# Patient Record
Sex: MALE
[De-identification: names, ages, dates, MRNs, and addresses within clinical notes are randomized per-mention and may not be internally consistent; named-entity substitution may affect disease eponyms.]

---

## 2021-01-01 ENCOUNTER — APPOINTMENT (OUTPATIENT)
Dept: VASCULAR SURGERY | Facility: CLINIC | Age: 76
End: 2021-01-01

## 2022-01-01 ENCOUNTER — INPATIENT (INPATIENT)
Facility: HOSPITAL | Age: 77
LOS: 1 days | DRG: 283 | End: 2022-08-17
Attending: THORACIC SURGERY (CARDIOTHORACIC VASCULAR SURGERY) | Admitting: THORACIC SURGERY (CARDIOTHORACIC VASCULAR SURGERY)
Payer: MEDICARE

## 2022-01-01 VITALS
HEART RATE: 61 BPM | DIASTOLIC BLOOD PRESSURE: 50 MMHG | SYSTOLIC BLOOD PRESSURE: 106 MMHG | RESPIRATION RATE: 41 BRPM | OXYGEN SATURATION: 100 %

## 2022-01-01 VITALS — TEMPERATURE: 98 F

## 2022-01-01 DIAGNOSIS — I71.2 THORACIC AORTIC ANEURYSM, WITHOUT RUPTURE: ICD-10-CM

## 2022-01-01 DIAGNOSIS — Z98.890 OTHER SPECIFIED POSTPROCEDURAL STATES: Chronic | ICD-10-CM

## 2022-01-01 DIAGNOSIS — I73.9 PERIPHERAL VASCULAR DISEASE, UNSPECIFIED: ICD-10-CM

## 2022-01-01 DIAGNOSIS — Z86.79 PERSONAL HISTORY OF OTHER DISEASES OF THE CIRCULATORY SYSTEM: ICD-10-CM

## 2022-01-01 DIAGNOSIS — I71.8 AORTIC ANEURYSM OF UNSPECIFIED SITE, RUPTURED: Chronic | ICD-10-CM

## 2022-01-01 DIAGNOSIS — I21.4 NON-ST ELEVATION (NSTEMI) MYOCARDIAL INFARCTION: ICD-10-CM

## 2022-01-01 DIAGNOSIS — N18.30 CHRONIC KIDNEY DISEASE, STAGE 3 UNSPECIFIED: ICD-10-CM

## 2022-01-01 DIAGNOSIS — I44.1 ATRIOVENTRICULAR BLOCK, SECOND DEGREE: ICD-10-CM

## 2022-01-01 DIAGNOSIS — S68.411A COMPLETE TRAUMATIC AMPUTATION OF RIGHT HAND AT WRIST LEVEL, INITIAL ENCOUNTER: Chronic | ICD-10-CM

## 2022-01-01 DIAGNOSIS — I70.209 UNSPECIFIED ATHEROSCLEROSIS OF NATIVE ARTERIES OF EXTREMITIES, UNSPECIFIED EXTREMITY: Chronic | ICD-10-CM

## 2022-01-01 LAB
A1C WITH ESTIMATED AVERAGE GLUCOSE RESULT: 4.5 % — SIGNIFICANT CHANGE UP (ref 4–5.6)
ALBUMIN SERPL ELPH-MCNC: 3.3 G/DL — SIGNIFICANT CHANGE UP (ref 3.3–5)
ALBUMIN SERPL ELPH-MCNC: 3.6 G/DL — SIGNIFICANT CHANGE UP (ref 3.3–5)
ALBUMIN SERPL ELPH-MCNC: 3.9 G/DL — SIGNIFICANT CHANGE UP (ref 3.3–5)
ALP SERPL-CCNC: 114 U/L — SIGNIFICANT CHANGE UP (ref 40–120)
ALP SERPL-CCNC: 117 U/L — SIGNIFICANT CHANGE UP (ref 40–120)
ALP SERPL-CCNC: 94 U/L — SIGNIFICANT CHANGE UP (ref 40–120)
ALT FLD-CCNC: 39 U/L — SIGNIFICANT CHANGE UP (ref 10–45)
ALT FLD-CCNC: 42 U/L — SIGNIFICANT CHANGE UP (ref 10–45)
ALT FLD-CCNC: 44 U/L — SIGNIFICANT CHANGE UP (ref 10–45)
AMPHET UR-MCNC: NEGATIVE — SIGNIFICANT CHANGE UP
ANION GAP SERPL CALC-SCNC: 12 MMOL/L — SIGNIFICANT CHANGE UP (ref 5–17)
ANION GAP SERPL CALC-SCNC: 12 MMOL/L — SIGNIFICANT CHANGE UP (ref 5–17)
ANION GAP SERPL CALC-SCNC: 13 MMOL/L — SIGNIFICANT CHANGE UP (ref 5–17)
ANISOCYTOSIS BLD QL: SLIGHT — SIGNIFICANT CHANGE UP
APPEARANCE UR: CLEAR — SIGNIFICANT CHANGE UP
APTT BLD: 27.4 SEC — LOW (ref 27.5–35.5)
APTT BLD: 29.1 SEC — SIGNIFICANT CHANGE UP (ref 27.5–35.5)
APTT BLD: 31.1 SEC — SIGNIFICANT CHANGE UP (ref 27.5–35.5)
AST SERPL-CCNC: 55 U/L — HIGH (ref 10–40)
AST SERPL-CCNC: 90 U/L — HIGH (ref 10–40)
AST SERPL-CCNC: 93 U/L — HIGH (ref 10–40)
BACTERIA # UR AUTO: ABNORMAL /HPF
BARBITURATES UR SCN-MCNC: NEGATIVE — SIGNIFICANT CHANGE UP
BASOPHILS # BLD AUTO: 0 K/UL — SIGNIFICANT CHANGE UP (ref 0–0.2)
BASOPHILS NFR BLD AUTO: 0 % — SIGNIFICANT CHANGE UP (ref 0–2)
BENZODIAZ UR-MCNC: NEGATIVE — SIGNIFICANT CHANGE UP
BILIRUB SERPL-MCNC: 0.7 MG/DL — SIGNIFICANT CHANGE UP (ref 0.2–1.2)
BILIRUB SERPL-MCNC: 1.1 MG/DL — SIGNIFICANT CHANGE UP (ref 0.2–1.2)
BILIRUB SERPL-MCNC: 1.2 MG/DL — SIGNIFICANT CHANGE UP (ref 0.2–1.2)
BILIRUB UR-MCNC: NEGATIVE — SIGNIFICANT CHANGE UP
BLD GP AB SCN SERPL QL: NEGATIVE — SIGNIFICANT CHANGE UP
BUN SERPL-MCNC: 101 MG/DL — HIGH (ref 7–23)
BUN SERPL-MCNC: 179 MG/DL — HIGH (ref 7–23)
BUN SERPL-MCNC: 82 MG/DL — HIGH (ref 7–23)
BURR CELLS BLD QL SMEAR: PRESENT — SIGNIFICANT CHANGE UP
CALCIUM SERPL-MCNC: 11.6 MG/DL — HIGH (ref 8.4–10.5)
CALCIUM SERPL-MCNC: 11.8 MG/DL — HIGH (ref 8.4–10.5)
CALCIUM SERPL-MCNC: 9.8 MG/DL — SIGNIFICANT CHANGE UP (ref 8.4–10.5)
CHLORIDE SERPL-SCNC: 109 MMOL/L — HIGH (ref 96–108)
CHLORIDE SERPL-SCNC: 110 MMOL/L — HIGH (ref 96–108)
CHLORIDE SERPL-SCNC: 114 MMOL/L — HIGH (ref 96–108)
CHOLEST SERPL-MCNC: 107 MG/DL — SIGNIFICANT CHANGE UP
CO2 SERPL-SCNC: 17 MMOL/L — LOW (ref 22–31)
CO2 SERPL-SCNC: 18 MMOL/L — LOW (ref 22–31)
CO2 SERPL-SCNC: 19 MMOL/L — LOW (ref 22–31)
COCAINE METAB.OTHER UR-MCNC: NEGATIVE — SIGNIFICANT CHANGE UP
COLOR SPEC: YELLOW — SIGNIFICANT CHANGE UP
CREAT SERPL-MCNC: 2.24 MG/DL — HIGH (ref 0.5–1.3)
CREAT SERPL-MCNC: 2.58 MG/DL — HIGH (ref 0.5–1.3)
CREAT SERPL-MCNC: 3.87 MG/DL — HIGH (ref 0.5–1.3)
DIFF PNL FLD: NEGATIVE — SIGNIFICANT CHANGE UP
EGFR: 16 ML/MIN/1.73M2 — LOW
EGFR: 25 ML/MIN/1.73M2 — LOW
EGFR: 30 ML/MIN/1.73M2 — LOW
EOSINOPHIL # BLD AUTO: 0 K/UL — SIGNIFICANT CHANGE UP (ref 0–0.5)
EOSINOPHIL NFR BLD AUTO: 0 % — SIGNIFICANT CHANGE UP (ref 0–6)
EPI CELLS # UR: SIGNIFICANT CHANGE UP /HPF (ref 0–5)
ESTIMATED AVERAGE GLUCOSE: 82 MG/DL — SIGNIFICANT CHANGE UP (ref 68–114)
GAS PNL BLDA: SIGNIFICANT CHANGE UP
GAS PNL BLDA: SIGNIFICANT CHANGE UP
GLUCOSE BLDC GLUCOMTR-MCNC: 105 MG/DL — HIGH (ref 70–99)
GLUCOSE SERPL-MCNC: 106 MG/DL — HIGH (ref 70–99)
GLUCOSE SERPL-MCNC: 114 MG/DL — HIGH (ref 70–99)
GLUCOSE SERPL-MCNC: 115 MG/DL — HIGH (ref 70–99)
GLUCOSE UR QL: NEGATIVE — SIGNIFICANT CHANGE UP
HCT VFR BLD CALC: 18.3 % — CRITICAL LOW (ref 39–50)
HCT VFR BLD CALC: 28.6 % — LOW (ref 39–50)
HCT VFR BLD CALC: 31.6 % — LOW (ref 39–50)
HCV AB S/CO SERPL IA: 0.04 S/CO — SIGNIFICANT CHANGE UP
HCV AB SERPL-IMP: SIGNIFICANT CHANGE UP
HDLC SERPL-MCNC: 50 MG/DL — SIGNIFICANT CHANGE UP
HGB BLD-MCNC: 10.6 G/DL — LOW (ref 13–17)
HGB BLD-MCNC: 5.8 G/DL — CRITICAL LOW (ref 13–17)
HGB BLD-MCNC: 9.7 G/DL — LOW (ref 13–17)
HYALINE CASTS # UR AUTO: SIGNIFICANT CHANGE UP /LPF (ref 0–2)
HYPOCHROMIA BLD QL: SLIGHT — SIGNIFICANT CHANGE UP
INR BLD: 1.57 — HIGH (ref 0.88–1.16)
INR BLD: 1.62 — HIGH (ref 0.88–1.16)
INR BLD: 1.7 — HIGH (ref 0.88–1.16)
KETONES UR-MCNC: NEGATIVE — SIGNIFICANT CHANGE UP
LACTATE SERPL-SCNC: 1.1 MMOL/L — SIGNIFICANT CHANGE UP (ref 0.5–2)
LACTATE SERPL-SCNC: 1.4 MMOL/L — SIGNIFICANT CHANGE UP (ref 0.5–2)
LEUKOCYTE ESTERASE UR-ACNC: NEGATIVE — SIGNIFICANT CHANGE UP
LIPID PNL WITH DIRECT LDL SERPL: 37 MG/DL — SIGNIFICANT CHANGE UP
LYMPHOCYTES # BLD AUTO: 0.2 K/UL — LOW (ref 1–3.3)
LYMPHOCYTES # BLD AUTO: 1.8 % — LOW (ref 13–44)
MAGNESIUM SERPL-MCNC: 1.7 MG/DL — SIGNIFICANT CHANGE UP (ref 1.6–2.6)
MAGNESIUM SERPL-MCNC: 1.8 MG/DL — SIGNIFICANT CHANGE UP (ref 1.6–2.6)
MAGNESIUM SERPL-MCNC: 2.1 MG/DL — SIGNIFICANT CHANGE UP (ref 1.6–2.6)
MANUAL SMEAR VERIFICATION: SIGNIFICANT CHANGE UP
MCHC RBC-ENTMCNC: 31.7 GM/DL — LOW (ref 32–36)
MCHC RBC-ENTMCNC: 32.6 PG — SIGNIFICANT CHANGE UP (ref 27–34)
MCHC RBC-ENTMCNC: 32.9 PG — SIGNIFICANT CHANGE UP (ref 27–34)
MCHC RBC-ENTMCNC: 33 PG — SIGNIFICANT CHANGE UP (ref 27–34)
MCHC RBC-ENTMCNC: 33.5 GM/DL — SIGNIFICANT CHANGE UP (ref 32–36)
MCHC RBC-ENTMCNC: 33.9 GM/DL — SIGNIFICANT CHANGE UP (ref 32–36)
MCV RBC AUTO: 102.8 FL — HIGH (ref 80–100)
MCV RBC AUTO: 96.9 FL — SIGNIFICANT CHANGE UP (ref 80–100)
MCV RBC AUTO: 98.4 FL — SIGNIFICANT CHANGE UP (ref 80–100)
METHADONE UR-MCNC: NEGATIVE — SIGNIFICANT CHANGE UP
MONOCYTES # BLD AUTO: 0 K/UL — SIGNIFICANT CHANGE UP (ref 0–0.9)
MONOCYTES NFR BLD AUTO: 0 % — LOW (ref 2–14)
NEUTROPHILS # BLD AUTO: 11.11 K/UL — HIGH (ref 1.8–7.4)
NEUTROPHILS NFR BLD AUTO: 98.2 % — HIGH (ref 43–77)
NITRITE UR-MCNC: NEGATIVE — SIGNIFICANT CHANGE UP
NON HDL CHOLESTEROL: 57 MG/DL — SIGNIFICANT CHANGE UP
NRBC # BLD: 0 /100 WBCS — SIGNIFICANT CHANGE UP (ref 0–0)
NRBC # BLD: 0 /100 WBCS — SIGNIFICANT CHANGE UP (ref 0–0)
NT-PROBNP SERPL-SCNC: HIGH PG/ML (ref 0–300)
OPIATES UR-MCNC: POSITIVE
OVALOCYTES BLD QL SMEAR: SLIGHT — SIGNIFICANT CHANGE UP
PCP SPEC-MCNC: SIGNIFICANT CHANGE UP
PCP UR-MCNC: NEGATIVE — SIGNIFICANT CHANGE UP
PH UR: 6 — SIGNIFICANT CHANGE UP (ref 5–8)
PHOSPHATE SERPL-MCNC: 2.9 MG/DL — SIGNIFICANT CHANGE UP (ref 2.5–4.5)
PHOSPHATE SERPL-MCNC: 3 MG/DL — SIGNIFICANT CHANGE UP (ref 2.5–4.5)
PLAT MORPH BLD: NORMAL — SIGNIFICANT CHANGE UP
PLATELET # BLD AUTO: 162 K/UL — SIGNIFICANT CHANGE UP (ref 150–400)
PLATELET # BLD AUTO: 175 K/UL — SIGNIFICANT CHANGE UP (ref 150–400)
PLATELET # BLD AUTO: 176 K/UL — SIGNIFICANT CHANGE UP (ref 150–400)
POIKILOCYTOSIS BLD QL AUTO: SLIGHT — SIGNIFICANT CHANGE UP
POLYCHROMASIA BLD QL SMEAR: SLIGHT — SIGNIFICANT CHANGE UP
POTASSIUM SERPL-MCNC: 4.8 MMOL/L — SIGNIFICANT CHANGE UP (ref 3.5–5.3)
POTASSIUM SERPL-MCNC: 5 MMOL/L — SIGNIFICANT CHANGE UP (ref 3.5–5.3)
POTASSIUM SERPL-MCNC: 6.1 MMOL/L — HIGH (ref 3.5–5.3)
POTASSIUM SERPL-SCNC: 4.8 MMOL/L — SIGNIFICANT CHANGE UP (ref 3.5–5.3)
POTASSIUM SERPL-SCNC: 5 MMOL/L — SIGNIFICANT CHANGE UP (ref 3.5–5.3)
POTASSIUM SERPL-SCNC: 6.1 MMOL/L — HIGH (ref 3.5–5.3)
PROT SERPL-MCNC: 4.8 G/DL — LOW (ref 6–8.3)
PROT SERPL-MCNC: 6.1 G/DL — SIGNIFICANT CHANGE UP (ref 6–8.3)
PROT SERPL-MCNC: 6.1 G/DL — SIGNIFICANT CHANGE UP (ref 6–8.3)
PROT UR-MCNC: ABNORMAL MG/DL
PROTHROM AB SERPL-ACNC: 18.8 SEC — HIGH (ref 10.5–13.4)
PROTHROM AB SERPL-ACNC: 19.4 SEC — HIGH (ref 10.5–13.4)
PROTHROM AB SERPL-ACNC: 20.3 SEC — HIGH (ref 10.5–13.4)
RBC # BLD: 1.78 M/UL — LOW (ref 4.2–5.8)
RBC # BLD: 2.95 M/UL — LOW (ref 4.2–5.8)
RBC # BLD: 3.21 M/UL — LOW (ref 4.2–5.8)
RBC # FLD: 14.2 % — SIGNIFICANT CHANGE UP (ref 10.3–14.5)
RBC # FLD: 14.4 % — SIGNIFICANT CHANGE UP (ref 10.3–14.5)
RBC # FLD: 14.7 % — HIGH (ref 10.3–14.5)
RBC BLD AUTO: ABNORMAL
RBC CASTS # UR COMP ASSIST: ABNORMAL /HPF
RH IG SCN BLD-IMP: POSITIVE — SIGNIFICANT CHANGE UP
RH IG SCN BLD-IMP: POSITIVE — SIGNIFICANT CHANGE UP
SARS-COV-2 RNA SPEC QL NAA+PROBE: SIGNIFICANT CHANGE UP
SCHISTOCYTES BLD QL AUTO: SLIGHT — SIGNIFICANT CHANGE UP
SODIUM SERPL-SCNC: 140 MMOL/L — SIGNIFICANT CHANGE UP (ref 135–145)
SODIUM SERPL-SCNC: 140 MMOL/L — SIGNIFICANT CHANGE UP (ref 135–145)
SODIUM SERPL-SCNC: 144 MMOL/L — SIGNIFICANT CHANGE UP (ref 135–145)
SP GR SPEC: 1.01 — SIGNIFICANT CHANGE UP (ref 1–1.03)
THC UR QL: NEGATIVE — SIGNIFICANT CHANGE UP
TRIGL SERPL-MCNC: 100 MG/DL — SIGNIFICANT CHANGE UP
TROPONIN T SERPL-MCNC: 0.05 NG/ML — CRITICAL HIGH (ref 0–0.01)
TSH SERPL-MCNC: 1.5 UIU/ML — SIGNIFICANT CHANGE UP (ref 0.27–4.2)
UROBILINOGEN FLD QL: 0.2 E.U./DL — SIGNIFICANT CHANGE UP
WBC # BLD: 11.31 K/UL — HIGH (ref 3.8–10.5)
WBC # BLD: 11.31 K/UL — HIGH (ref 3.8–10.5)
WBC # BLD: 13.64 K/UL — HIGH (ref 3.8–10.5)
WBC # FLD AUTO: 11.31 K/UL — HIGH (ref 3.8–10.5)
WBC # FLD AUTO: 11.31 K/UL — HIGH (ref 3.8–10.5)
WBC # FLD AUTO: 13.64 K/UL — HIGH (ref 3.8–10.5)
WBC UR QL: < 5 /HPF — SIGNIFICANT CHANGE UP

## 2022-01-01 PROCEDURE — 99223 1ST HOSP IP/OBS HIGH 75: CPT

## 2022-01-01 PROCEDURE — 99291 CRITICAL CARE FIRST HOUR: CPT

## 2022-01-01 PROCEDURE — 74018 RADEX ABDOMEN 1 VIEW: CPT | Mod: 26

## 2022-01-01 PROCEDURE — 99292 CRITICAL CARE ADDL 30 MIN: CPT

## 2022-01-01 PROCEDURE — 93010 ELECTROCARDIOGRAM REPORT: CPT

## 2022-01-01 PROCEDURE — 71045 X-RAY EXAM CHEST 1 VIEW: CPT | Mod: 26

## 2022-01-01 RX ORDER — ALBUMIN HUMAN 25 %
250 VIAL (ML) INTRAVENOUS
Refills: 0 | Status: COMPLETED | OUTPATIENT
Start: 2022-01-01 | End: 2022-01-01

## 2022-01-01 RX ORDER — HYDRALAZINE HCL 50 MG
10 TABLET ORAL EVERY 6 HOURS
Refills: 0 | Status: DISCONTINUED | OUTPATIENT
Start: 2022-01-01 | End: 2022-01-01

## 2022-01-01 RX ORDER — NITROGLYCERIN 6.5 MG
1 CAPSULE, EXTENDED RELEASE ORAL DAILY
Refills: 0 | Status: DISCONTINUED | OUTPATIENT
Start: 2022-01-01 | End: 2022-01-01

## 2022-01-01 RX ORDER — METOPROLOL TARTRATE 50 MG
5 TABLET ORAL EVERY 8 HOURS
Refills: 0 | Status: DISCONTINUED | OUTPATIENT
Start: 2022-01-01 | End: 2022-01-01

## 2022-01-01 RX ORDER — SODIUM CHLORIDE 9 MG/ML
3 INJECTION INTRAMUSCULAR; INTRAVENOUS; SUBCUTANEOUS EVERY 8 HOURS
Refills: 0 | Status: DISCONTINUED | OUTPATIENT
Start: 2022-01-01 | End: 2022-01-01

## 2022-01-01 RX ORDER — LANOLIN ALCOHOL/MO/W.PET/CERES
1 CREAM (GRAM) TOPICAL
Qty: 0 | Refills: 0 | DISCHARGE

## 2022-01-01 RX ORDER — NICARDIPINE HYDROCHLORIDE 30 MG/1
5 CAPSULE, EXTENDED RELEASE ORAL
Qty: 40 | Refills: 0 | Status: DISCONTINUED | OUTPATIENT
Start: 2022-01-01 | End: 2022-01-01

## 2022-01-01 RX ORDER — FUROSEMIDE 40 MG
40 TABLET ORAL ONCE
Refills: 0 | Status: COMPLETED | OUTPATIENT
Start: 2022-01-01 | End: 2022-01-01

## 2022-01-01 RX ORDER — PIPERACILLIN AND TAZOBACTAM 4; .5 G/20ML; G/20ML
3.38 INJECTION, POWDER, LYOPHILIZED, FOR SOLUTION INTRAVENOUS EVERY 8 HOURS
Refills: 0 | Status: DISCONTINUED | OUTPATIENT
Start: 2022-01-01 | End: 2022-01-01

## 2022-01-01 RX ORDER — ACETAMINOPHEN 500 MG
1000 TABLET ORAL ONCE
Refills: 0 | Status: COMPLETED | OUTPATIENT
Start: 2022-01-01 | End: 2022-01-01

## 2022-01-01 RX ORDER — SENNA PLUS 8.6 MG/1
2 TABLET ORAL
Qty: 0 | Refills: 0 | DISCHARGE

## 2022-01-01 RX ORDER — QUETIAPINE FUMARATE 200 MG/1
0 TABLET, FILM COATED ORAL
Qty: 0 | Refills: 0 | DISCHARGE

## 2022-01-01 RX ORDER — METOPROLOL TARTRATE 50 MG
1 TABLET ORAL
Qty: 0 | Refills: 0 | DISCHARGE

## 2022-01-01 RX ORDER — MORPHINE SULFATE 50 MG/1
2.5 CAPSULE, EXTENDED RELEASE ORAL
Refills: 0 | Status: DISCONTINUED | OUTPATIENT
Start: 2022-01-01 | End: 2022-01-01

## 2022-01-01 RX ORDER — PIPERACILLIN AND TAZOBACTAM 4; .5 G/20ML; G/20ML
2.25 INJECTION, POWDER, LYOPHILIZED, FOR SOLUTION INTRAVENOUS EVERY 6 HOURS
Refills: 0 | Status: DISCONTINUED | OUTPATIENT
Start: 2022-01-01 | End: 2022-01-01

## 2022-01-01 RX ORDER — THIAMINE MONONITRATE (VIT B1) 100 MG
1 TABLET ORAL
Qty: 0 | Refills: 0 | DISCHARGE

## 2022-01-01 RX ORDER — PIPERACILLIN AND TAZOBACTAM 4; .5 G/20ML; G/20ML
3.38 INJECTION, POWDER, LYOPHILIZED, FOR SOLUTION INTRAVENOUS ONCE
Refills: 0 | Status: COMPLETED | OUTPATIENT
Start: 2022-01-01 | End: 2022-01-01

## 2022-01-01 RX ORDER — IPRATROPIUM/ALBUTEROL SULFATE 18-103MCG
3 AEROSOL WITH ADAPTER (GRAM) INHALATION ONCE
Refills: 0 | Status: COMPLETED | OUTPATIENT
Start: 2022-01-01 | End: 2022-01-01

## 2022-01-01 RX ORDER — ROBINUL 0.2 MG/ML
0.4 INJECTION INTRAMUSCULAR; INTRAVENOUS
Refills: 0 | Status: DISCONTINUED | OUTPATIENT
Start: 2022-01-01 | End: 2022-01-01

## 2022-01-01 RX ORDER — MORPHINE SULFATE 50 MG/1
1 CAPSULE, EXTENDED RELEASE ORAL
Qty: 100 | Refills: 0 | Status: DISCONTINUED | OUTPATIENT
Start: 2022-01-01 | End: 2022-01-01

## 2022-01-01 RX ADMIN — Medication 5 MILLIGRAM(S): at 07:37

## 2022-01-01 RX ADMIN — Medication 40 MILLIGRAM(S): at 09:11

## 2022-01-01 RX ADMIN — MORPHINE SULFATE 2.5 MILLIGRAM(S): 50 CAPSULE, EXTENDED RELEASE ORAL at 23:00

## 2022-01-01 RX ADMIN — PIPERACILLIN AND TAZOBACTAM 200 GRAM(S): 4; .5 INJECTION, POWDER, LYOPHILIZED, FOR SOLUTION INTRAVENOUS at 13:11

## 2022-01-01 RX ADMIN — SODIUM CHLORIDE 3 MILLILITER(S): 9 INJECTION INTRAMUSCULAR; INTRAVENOUS; SUBCUTANEOUS at 05:28

## 2022-01-01 RX ADMIN — SODIUM CHLORIDE 3 MILLILITER(S): 9 INJECTION INTRAMUSCULAR; INTRAVENOUS; SUBCUTANEOUS at 22:29

## 2022-01-01 RX ADMIN — Medication 1 PATCH: at 01:15

## 2022-01-01 RX ADMIN — MORPHINE SULFATE 2.5 MILLIGRAM(S): 50 CAPSULE, EXTENDED RELEASE ORAL at 05:07

## 2022-01-01 RX ADMIN — PIPERACILLIN AND TAZOBACTAM 100 GRAM(S): 4; .5 INJECTION, POWDER, LYOPHILIZED, FOR SOLUTION INTRAVENOUS at 01:13

## 2022-01-01 RX ADMIN — PIPERACILLIN AND TAZOBACTAM 100 GRAM(S): 4; .5 INJECTION, POWDER, LYOPHILIZED, FOR SOLUTION INTRAVENOUS at 19:32

## 2022-01-01 RX ADMIN — Medication 1000 MILLIGRAM(S): at 01:44

## 2022-01-01 RX ADMIN — Medication 125 MILLILITER(S): at 02:41

## 2022-01-01 RX ADMIN — SODIUM CHLORIDE 3 MILLILITER(S): 9 INJECTION INTRAMUSCULAR; INTRAVENOUS; SUBCUTANEOUS at 21:40

## 2022-01-01 RX ADMIN — Medication 125 MILLILITER(S): at 07:07

## 2022-01-01 RX ADMIN — MORPHINE SULFATE 2.5 MILLIGRAM(S): 50 CAPSULE, EXTENDED RELEASE ORAL at 22:13

## 2022-01-01 RX ADMIN — NICARDIPINE HYDROCHLORIDE 25 MG/HR: 30 CAPSULE, EXTENDED RELEASE ORAL at 01:04

## 2022-01-01 RX ADMIN — MORPHINE SULFATE 1 MG/HR: 50 CAPSULE, EXTENDED RELEASE ORAL at 08:13

## 2022-01-01 RX ADMIN — MORPHINE SULFATE 2.5 MILLIGRAM(S): 50 CAPSULE, EXTENDED RELEASE ORAL at 05:40

## 2022-01-01 RX ADMIN — Medication 125 MILLILITER(S): at 03:23

## 2022-01-01 RX ADMIN — Medication 10 MILLIGRAM(S): at 12:43

## 2022-01-01 RX ADMIN — Medication 125 MILLILITER(S): at 06:35

## 2022-01-01 RX ADMIN — NICARDIPINE HYDROCHLORIDE 25 MG/HR: 30 CAPSULE, EXTENDED RELEASE ORAL at 23:43

## 2022-01-01 RX ADMIN — Medication 1 PATCH: at 13:11

## 2022-01-01 RX ADMIN — MORPHINE SULFATE 1 MG/HR: 50 CAPSULE, EXTENDED RELEASE ORAL at 18:58

## 2022-01-01 RX ADMIN — Medication 400 MILLIGRAM(S): at 01:04

## 2022-01-01 RX ADMIN — Medication 10 MILLIGRAM(S): at 12:16

## 2022-01-01 RX ADMIN — MORPHINE SULFATE 1 MG/HR: 50 CAPSULE, EXTENDED RELEASE ORAL at 19:42

## 2022-01-01 RX ADMIN — Medication 5 MILLIGRAM(S): at 14:12

## 2022-01-01 RX ADMIN — SODIUM CHLORIDE 3 MILLILITER(S): 9 INJECTION INTRAMUSCULAR; INTRAVENOUS; SUBCUTANEOUS at 14:10

## 2022-01-01 RX ADMIN — Medication 3 MILLILITER(S): at 03:39

## 2022-01-01 RX ADMIN — Medication 1 PATCH: at 19:30

## 2022-01-01 RX ADMIN — ROBINUL 0.4 MILLIGRAM(S): 0.2 INJECTION INTRAMUSCULAR; INTRAVENOUS at 22:14

## 2022-08-15 NOTE — H&P ADULT - NSHPPHYSICALEXAM_GEN_ALL_CORE
Physical Exam  CONSTITUTIONAL:                                                              cachectic, poor condition  NEURO:                                                                       A&Ox1                     EYES:                                                                                WNL  ENMT:                                                                               WNL  CV:                                                                                   loud systolic murmur  RESPIRATORY:                                                                 cracles at both bases  GI:                                                                                     WNL  : PICKARD + / -                                                                  WNL  MUSKULOSKELETAL:                                                       WNL  SKIN / BREAST:                                                                  sternal wire can be seen under mid portion of sternotomy scar  Extremities:                     right wrist amputation; poor femoral and distal pulses throughout    ICU Vital Signs Last 24 Hrs  T(C): 36.4 (15 Aug 2022 21:56), Max: 36.4 (15 Aug 2022 21:56)  T(F): 97.5 (15 Aug 2022 21:56), Max: 97.5 (15 Aug 2022 21:56)  HR: --  BP: --  BP(mean): --  ABP: --  ABP(mean): --  RR: --  SpO2: --

## 2022-08-15 NOTE — H&P ADULT - NSICDXPASTSURGICALHX_GEN_ALL_CORE_FT
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

## 2022-08-15 NOTE — H&P ADULT - NSICDXPASTMEDICALHX_GEN_ALL_CORE_FT
PAST MEDICAL HISTORY:  H/O aortic aneurysm     Hearing impaired     HTN (hypertension)     PVD (peripheral vascular disease)     Stage 3 chronic kidney disease

## 2022-08-15 NOTE — PROGRESS NOTE ADULT - SUBJECTIVE AND OBJECTIVE BOX
CTICU  CRITICAL  CARE  attending     Hand off received 					   Pertinent clinical, laboratory, radiographic, hemodynamic, echocardiographic, respiratory data, microbiologic data and chart were reviewed and analyzed frequently throughout the course of the day and night      76 years old male with HTN, CKD, PVD, BPH, Cervical spondylosis, chronic low back pain, hepatic encephalopathy, ascites, BPH, congenital defect, phocomelia, depression, aortic aneurysm S/P stent graft to the descending aorta.  He was admitted to [facility] from [facility] with uncontrolled HTN and  abdominal pain. He had 2 syncopal episodes. He ruled in for NSTEMI.   CT angio showed Type B aortic dissection starting from the arch distal to the origin of the left subclavian artery extending to the right common iliac and right external iliac artery.    The left common iliac and left external iliac arteries are thrombosed.  There is a patent graft between bilateral femoral arteries.   AA is 5 x 5.4 cm, ARCH is 4.1 cm.  MPA is 3.5 cm.   The entire aorta is dilated. There is a patent vascular stent in the distal aorta extending to the right common iliac artery.  CT Chest and Abdomen: 3 cm stone in the gall bladder. 9mm left renal cyst. 1.2 cm right renal cyst. Patchy infiltrates in the right lung and posterior aspects of both lungs.   ECHO: Severe MR, Moderate AI, Severe pulmonary HTN, Relatively normal ejection fraction due to MR.  EKG: Left bundle branch block with a long ID interval. Mobitz Type II heat block and intermitted CHB with HR in 30's during sleep.   CT HEAD: Cerebral atrophy. No acute infarct or bleed.   CT cervical spine: Degenerative changes in C4/C%, C5/C^, C6/C7, C8/T1. Facet hypertrophy in C2/C3, C3/C4, C7/T1. Decrease in disc space height noted.   He was started on IV nicardipine infusion and transferred to [facility].  He was started on IV vancomycin and Zosyn for pneumonia.      FAMILY HISTORY:  PAST MEDICAL & SURGICAL HISTORY:       14 system review was unremarkable    Vital signs, hemodynamic and respiratory parameters were reviewed from the bedside nursing flow sheet.  ICU Vital Signs Last 24 Hrs  T(C): 36.4 (15 Aug 2022 21:56), Max: 36.4 (15 Aug 2022 21:56)  T(F): 97.5 (15 Aug 2022 21:56), Max: 97.5 (15 Aug 2022 21:56)  HR: --  BP: --  BP(mean): --  ABP: --  ABP(mean): --  RR: --  SpO2: --      Adult Advanced Hemodynamics Last 24 Hrs  CVP(mm Hg): --  CVP(cm H2O): --  CO: --  CI: --  PA: --  PA(mean): --  PCWP: --  SVR: --  SVRI: --  PVR: --  PVRI: --,     Intake and output was reviewed and the fluid balance was calculated  Daily     Daily   I&O's Summary      All lines and drain sites were assessed    Neuro: Slightly lethargic. Moves all 4 extremities. Does not follow commands. Not answering any questions.  Neck: No JVD.  CVS: S1, S2, No S3.  CHEST: Midline sternotomy scar. Sternal wires are palpable. Bilateral Gynecomestia  Lungs: Diminished air entry bilaterally. Right > Left).  Abd: Soft. No tenderness. + Bowel sounds.  Vascular: No DP/PT.  Extremities: No edema. 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  Lymphatic: Normal.  Skin: No abnormalities.      labs  CBC Full  -  ( 15 Aug 2022 21:38 )  WBC Count : 11.31 K/uL  RBC Count : 3.21 M/uL  Hemoglobin : 10.6 g/dL  Hematocrit : 31.6 %  Platelet Count - Automated : 176 K/uL  Mean Cell Volume : 98.4 fl  Mean Cell Hemoglobin : 33.0 pg  Mean Cell Hemoglobin Concentration : 33.5 gm/dL  Auto Neutrophil # : x  Auto Lymphocyte # : x  Auto Monocyte # : x  Auto Eosinophil # : x  Auto Basophil # : x  Auto Neutrophil % : x  Auto Lymphocyte % : x  Auto Monocyte % : x  Auto Eosinophil % : x  Auto Basophil % : x    08-15    140  |  110<H>  |  82<H>  ----------------------------<  106<H>  5.0   |  18<L>  |  2.24<H>    Ca    11.8<H>      15 Aug 2022 21:38  Mg     1.7     08-15    TPro  6.1  /  Alb  3.6  /  TBili  1.2  /  DBili  x   /  AST  93<H>  /  ALT  42  /  AlkPhos  117  08-15    PT/INR - ( 15 Aug 2022 21:38 )   PT: 18.8 sec;   INR: 1.57          PTT - ( 15 Aug 2022 21:38 )  PTT:27.4 sec  The current medications were reviewed   MEDICATIONS  (STANDING):  niCARdipine Infusion 5 mG/Hr (25 mL/Hr) IV Continuous <Continuous>  sodium chloride 0.9% lock flush 3 milliLiter(s) IV Push every 8 hours          76y old  Male with multiple medical problems admitted with Type B aortic dissection.  Uncontrolled HTN  Metabolic acidosis  Renal Failure  Hypercalcemia  NSTEMI  Severe mitral Regurgitation  Pulmonary Hypertension.  Renal Cysts  Gall Stones.   Type II AV block with long ID interval and LBBB morphology.  Good oxygenation.  Fair urine out put.        My plan includes :  IV nicardipine.  Betablocker.  Statin Rx.  Close hemodynamic, ventilatory and drain monitoring and management  Monitor for arrhythmias and monitor parameters for organ perfusion  Monitor neurologic status  Monitor renal function.  D/C lines.  Head of the bed should remain elevated to 45 deg .   Chest PT and IS will be encouraged  Conitor adequacy of oxygenation and ventilation and attempt to wean oxygen  Nutritional goals will be met using po eventually , ensure adequate caloric intake and monitor the same  Stress ulcer and VTE prophylaxis will be achieved    Glycemic control is satisfactory  Electrolytes have been repleted as necessary and wound care has been carried out. Pain control has been achieved.   Aggressive physical therapy and early mobility and ambulation goals will be met   The family was updated about the course and plan  CRITICAL CARE TIME SPENT in evaluation and management, reassessments, review and interpretation of labs and x-rays, ventilator and hemodynamic management, formulating a plan and coordinating care: ___90____ MIN.  Time does not include procedural time.  CTICU ATTENDING     					    Kuldip Whitaker MD                        	 CTICU  CRITICAL  CARE  attending     Hand off received 					   Pertinent clinical, laboratory, radiographic, hemodynamic, echocardiographic, respiratory data, microbiologic data and chart were reviewed and analyzed frequently throughout the course of the day and night      76 years old male with HTN, CKD, PVD, BPH, Cervical spondylosis, chronic low back pain, hepatic encephalopathy, ascites, BPH, congenital defect, phocomelia, depression, aortic aneurysm S/P stent graft to the descending aorta.  He was admitted to [facility] from [facility] with uncontrolled HTN and  abdominal pain. He had 2 syncopal episodes. He ruled in for NSTEMI.   CT angio showed Type B aortic dissection starting from the arch distal to the origin of the left subclavian artery extending to the right common iliac and right external iliac artery.    The left common iliac and left external iliac arteries are thrombosed.  There is a patent graft between bilateral femoral arteries.   AA is 5 x 5.4 cm, ARCH is 4.1 cm.  MPA is 3.5 cm.   The entire aorta is dilated. There is a patent vascular stent in the distal aorta extending to the right common iliac artery.  CT Chest and Abdomen: 3 cm stone in the gall bladder. 9mm left renal cyst. 1.2 cm right renal cyst. Patchy infiltrates in the right lung and posterior aspects of both lungs.   ECHO: Severe MR, Moderate AI, Severe pulmonary HTN, Relatively normal ejection fraction due to MR.  EKG: Left bundle branch block with a long CA interval. Mobitz Type II heat block and intermitted CHB with HR in 30's during sleep.   CT HEAD: Cerebral atrophy. No acute infarct or bleed.   CT cervical spine: Degenerative changes in C4/C%, C5/C^, C6/C7, C8/T1. Facet hypertrophy in C2/C3, C3/C4, C7/T1. Decrease in disc space height noted.   He was started on IV nicardipine infusion and transferred to [facility].  He was started on IV vancomycin and Zosyn for pneumonia.      FAMILY HISTORY:  PAST MEDICAL & SURGICAL HISTORY:       14 system review was unremarkable    Vital signs, hemodynamic and respiratory parameters were reviewed from the bedside nursing flow sheet.  ICU Vital Signs Last 24 Hrs  T(C): 36.4 (15 Aug 2022 21:56), Max: 36.4 (15 Aug 2022 21:56)  T(F): 97.5 (15 Aug 2022 21:56), Max: 97.5 (15 Aug 2022 21:56)  HR: --  BP: --  BP(mean): --  ABP: --  ABP(mean): --  RR: --  SpO2: --      Adult Advanced Hemodynamics Last 24 Hrs  CVP(mm Hg): --  CVP(cm H2O): --  CO: --  CI: --  PA: --  PA(mean): --  PCWP: --  SVR: --  SVRI: --  PVR: --  PVRI: --,     Intake and output was reviewed and the fluid balance was calculated  Daily     Daily   I&O's Summary      All lines and drain sites were assessed    Neuro: Slightly lethargic. Moves all 4 extremities. Does not follow commands. Not answering any questions.  Neck: No JVD.  CVS: S1, S2, No S3.  CHEST: Midline sternotomy scar. Sternal wires are palpable. Bilateral Gynecomestia  Lungs: Diminished air entry bilaterally. Right > Left).  Abd: Soft. No tenderness. + Bowel sounds.  Vascular: No DP/PT.  Extremities: No edema. 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  Lymphatic: Normal.  Skin: No abnormalities.      labs  CBC Full  -  ( 15 Aug 2022 21:38 )  WBC Count : 11.31 K/uL  RBC Count : 3.21 M/uL  Hemoglobin : 10.6 g/dL  Hematocrit : 31.6 %  Platelet Count - Automated : 176 K/uL  Mean Cell Volume : 98.4 fl  Mean Cell Hemoglobin : 33.0 pg  Mean Cell Hemoglobin Concentration : 33.5 gm/dL  Auto Neutrophil # : x  Auto Lymphocyte # : x  Auto Monocyte # : x  Auto Eosinophil # : x  Auto Basophil # : x  Auto Neutrophil % : x  Auto Lymphocyte % : x  Auto Monocyte % : x  Auto Eosinophil % : x  Auto Basophil % : x    08-15    140  |  110<H>  |  82<H>  ----------------------------<  106<H>  5.0   |  18<L>  |  2.24<H>    Ca    11.8<H>      15 Aug 2022 21:38  Mg     1.7     08-15    TPro  6.1  /  Alb  3.6  /  TBili  1.2  /  DBili  x   /  AST  93<H>  /  ALT  42  /  AlkPhos  117  08-15    PT/INR - ( 15 Aug 2022 21:38 )   PT: 18.8 sec;   INR: 1.57          PTT - ( 15 Aug 2022 21:38 )  PTT:27.4 sec  The current medications were reviewed   MEDICATIONS  (STANDING):  niCARdipine Infusion 5 mG/Hr (25 mL/Hr) IV Continuous <Continuous>  sodium chloride 0.9% lock flush 3 milliLiter(s) IV Push every 8 hours          76y old  Male with multiple medical problems admitted with Type B aortic dissection.  Uncontrolled HTN  Metabolic acidosis  Renal Failure  Hypercalcemia  NSTEMI  AV Block  Severe mitral Regurgitation  Pulmonary Hypertension.  Renal Cysts  Gall Stones.   Type II AV block with long CA interval and LBBB morphology.  Good oxygenation.  Fair urine out put.        My plan includes :  IV nicardipine.  Betablocker.  Statin Rx.  Close hemodynamic, ventilatory and drain monitoring and management  Monitor for arrhythmias and monitor parameters for organ perfusion  Monitor neurologic status  Monitor renal function.  New lines lines.  Head of the bed should remain elevated to 45 deg .   Chest PT and IS will be encouraged  Conitor adequacy of oxygenation and ventilation and attempt to wean oxygen  Nutritional goals will be met using po eventually , ensure adequate caloric intake and monitor the same  Stress ulcer and VTE prophylaxis will be achieved    Glycemic control is satisfactory  Electrolytes have been repleted as necessary and wound care has been carried out. Pain control has been achieved.   Aggressive physical therapy and early mobility and ambulation goals will be met   The family was updated about the course and plan  CRITICAL CARE TIME SPENT in evaluation and management, reassessments, review and interpretation of labs and x-rays, ventilator and hemodynamic management, formulating a plan and coordinating care: ___90____ MIN.  Time does not include procedural time.  CTICU ATTENDING     					    Kuldip Whitaker MD

## 2022-08-15 NOTE — H&P ADULT - HISTORY OF PRESENT ILLNESS
76 y/o male poor historian active DNR/DNI , [facility] Cardiac Surgery? (sternal wires on CXR), HTN, CKD, severe PVD, femoral artery stent graph, abdominal aorta stent to right common iliac, Right hand amputation. Admitted to [facility] ED from nursing facility c/o abdominal pain. PT according to facility nurse fell two times there but no reported LOC. CTA chest/Abd revealed aortic dissection extending from arch beyond Left subclavian artery down to right common iliac artery and proximal right external iliac. EKG showed NSTEMI and LBBB then 2:1 AV block. Echo showed severe MR, severe PHTN, moderate AI, falsely NL systolic LV function in the face of severe MR. PT transferred to [facility] under Dr. Piedra for evaluation and management.

## 2022-08-15 NOTE — H&P ADULT - NSHPLABSRESULTS_GEN_ALL_CORE
10.6   11.31 )-----------( 176      ( 15 Aug 2022 21:38 )             31.6   08-15    140  |  110<H>  |  82<H>  ----------------------------<  106<H>  5.0   |  18<L>  |  2.24<H>    Ca    11.8<H>      15 Aug 2022 21:38  Mg     1.7     08-15    TPro  6.1  /  Alb  3.6  /  TBili  1.2  /  DBili  x   /  AST  93<H>  /  ALT  42  /  AlkPhos  117  08-15

## 2022-08-15 NOTE — H&P ADULT - ASSESSMENT
78 y/o male poor historian active DNR/DNI , [facility] Cardiac Surgery? (sternal wires on CXR), HTN, CKD, severe PVD, femoral artery stent graph, abdominal aorta stent to right common iliac, Right hand amputation. Admitted to [facility] ED from nursing facility c/o abdominal pain. PT according to facility nurse fell two times there but no reported LOC. CTA chest/Abd revealed aortic dissection extending from arch beyond Left subclavian artery down to right common iliac artery and proximal right external iliac. EKG showed NSTEMI and LBBB then 2:1 AV block. Echo showed severe MR, severe PHTN, moderate AI, falsely NL systolic LV function in the face of severe MR. Type B dissection and abdominal aortic aneurysm.

## 2022-08-16 NOTE — DIETITIAN INITIAL EVALUATION ADULT - PERTINENT MEDS FT
MEDICATIONS  (STANDING):  bisacodyl Suppository 10 milliGRAM(s) Rectal once  metoprolol tartrate Injectable 5 milliGRAM(s) IV Push every 8 hours  nitroglycerin    Patch 0.2 mG/Hr(s) 1 patch Transdermal daily  piperacillin/tazobactam IVPB. 3.375 Gram(s) IV Intermittent once  piperacillin/tazobactam IVPB.. 2.25 Gram(s) IV Intermittent every 6 hours  sodium chloride 0.9% lock flush 3 milliLiter(s) IV Push every 8 hours    MEDICATIONS  (PRN):  hydrALAZINE Injectable 10 milliGRAM(s) IV Push every 6 hours PRN SBP greater than 150

## 2022-08-16 NOTE — DIETITIAN INITIAL EVALUATION ADULT - NSFNSPHYEXAMSKINFT_GEN_A_CORE
Pressure Injury 1: medial,sacral spine, Stage II  Pressure Injury 2: none, none  Pressure Injury 3: none, none  Pressure Injury 4: none, none  Pressure Injury 5: none, none  Pressure Injury 6: none, none  Pressure Injury 7: none, none  Pressure Injury 8: none, none  Pressure Injury 9: none, none  Pressure Injury 10: none, none  Pressure Injury 11: none, none, Pressure Injury 1: medial,sacral, Stage II  Pressure Injury 2: none, none  Pressure Injury 3: none, none  Pressure Injury 4: none, none  Pressure Injury 5: none, none  Pressure Injury 6: none, none  Pressure Injury 7: none, none  Pressure Injury 8: none, none  Pressure Injury 9: none, none  Pressure Injury 10: none, none  Pressure Injury 11: none, none

## 2022-08-16 NOTE — CHART NOTE - NSCHARTNOTEFT_GEN_A_CORE
Attempt made to contact patient's Nephew, Markel, at [phone]. No answer & mailbox full. Will attempt to contact again.

## 2022-08-16 NOTE — PATIENT PROFILE ADULT - HAVE YOU EXPERIENCED VIOLENCE OR A TRAUMATIC EVENT?
After Visit Summary   10/29/2017    Taniya العلي    MRN: [id number]           Patient Information     Date Of Birth          2010        Visit Information        Provider Department      10/29/2017 10:50 AM Carlene Perdomo PA-C [facility]        Today's Diagnoses     Strep throat    -  1    Throat pain           Follow-ups after your visit        Follow-up notes from your care team     Return if symptoms worsen or fail to improve.      Who to contact     If you have questions or need follow up information about today's clinic visit or your schedule please contact [facility] directly at [phone].  Normal or non-critical lab and imaging results will be communicated to you by Splash.[email or web address], letter or phone within 4 business days after the clinic has received the results. If you do not hear from us within 7 days, please contact the clinic through [email or web address] or phone. If you have a critical or abnormal lab result, we will notify you by phone as soon as possible.  Submit refill requests through Outsell or call your pharmacy and they will forward the refill request to us. Please allow 3 business days for your refill to be completed.          Additional Information About Your Visit        MyChart Information     Outsell lets you send messages to your doctor, view your test results, renew your prescriptions, schedule appointments and more. To sign up, go to [email or web address], contact your [facility] clinic or call [phone] during business hours.            Care EveryWhere ID     This is your Care EveryWhere ID. This could be used by other organizations to access your [facility] medical records  [id number]        Your Vitals Were     Pulse Temperature Pulse Oximetry             130 102.4  F (39.1  C) (Tympanic) 96%          Blood Pressure from Last 3 Encounters:   10/29/17 117/71   05/10/17 94/60   05/04/17 104/59    Weight from Last 3 Encounters:   10/29/17 67 lb 12.8  oz (30.8 kg) (>99 %)*   05/10/17 60 lb (27.2 kg) (99 %)*   05/04/17 61 lb 2 oz (27.7 kg) (>99 %)*     * Growth percentiles are based on [facility] 2-20 Years data.              We Performed the Following     Strep, Rapid Screen          Today's Medication Changes          These changes are accurate as of: 10/29/17  1:13 PM.  If you have any questions, ask your nurse or doctor.               Start taking these medicines.        Dose/Directions    amoxicillin 400 MG/5ML suspension   Commonly known as:  AMOXIL   Used for:  Strep throat        Dose:  500 mg   Take 6.3 mLs (500 mg) by mouth 2 times daily for 10 days   Quantity:  126 mL   Refills:  0            Where to get your medicines      These medications were sent to [facility] [address] [place] [address] AT [facility] & [address] [address]     Phone:  [phone]     amoxicillin 400 MG/5ML suspension                Primary Care Provider Office Phone # Fax #    Dorinda Conley, APRN [facility] [phone] [id number] [facility] [address]        Equal Access to Services     MABEL HOGAN AH: Hadii chio ku hadasho Soomaali, waaxda luqadaha, qaybta kaalmada adeegyada, waxay carlosin hayshenan gerhard pratt. So [facility] [phone].    ATENCIÓN: Si habla español, tiene a freedman disposición servicios gratuitos de asistencia lingüística. Llame al [phone].    We comply with applicable federal civil rights laws and [place] laws. We do not discriminate on the basis of race, color, national origin, age, disability, sex, sexual orientation, or gender identity.            Thank you!     Thank you for choosing [facility]  for your care. Our goal is always to provide you with excellent care. Hearing back from our patients is one way we can continue to improve our services. Please take a few minutes to complete the written survey that you may receive in the mail after your  visit with us. Thank you!             Your Updated Medication List - Protect others around you: Learn how to safely use, store and throw away your medicines at www.disposemymeds.org.          This list is accurate as of: 10/29/17  1:13 PM.  Always use your most recent med list.                   Brand Name Dispense Instructions for use Diagnosis    amoxicillin 400 MG/5ML suspension    AMOXIL    126 mL    Take 6.3 mLs (500 mg) by mouth 2 times daily for 10 days    Strep throat       VITAMIN D (CHOLECALCIFEROL) PO      Take 1,000 Units by mouth daily 1000mcg daily           no

## 2022-08-16 NOTE — CONSULT NOTE ADULT - SUBJECTIVE AND OBJECTIVE BOX
CRITICAL CARE CONSULT NOTE     HPI: 77 y/o male poor historian active DNR/DNI , PMH Cardiac Surgery? (sternal wires on CXR), HTN, CKD, severe PVD, femoral artery stent graph, abdominal aorta stent to right common iliac, Right hand amputation. Admitted to [facility] ED from nursing facility c/o abdominal pain. PT according to facility nurse fell two times there but no reported LOC. CTA chest/Abd revealed aortic dissection extending from arch beyond Left subclavian artery down to right common iliac artery and proximal right external iliac. EKG showed NSTEMI and LBBB then 2:1 AV block. Echo showed severe MR, severe PHTN, moderate AI, falsely NL systolic LV function in the face of severe MR. PT transferred to [facility] under Dr. Piedra for evaluation and management. Vascular surgery consulted for type B aortic dissection repair- patient deemed not surgical candidate. Per discussion with patient's HCP (nephew), would like patient's care to prioritize comfort however not withdraw any current medical interventions. MICU team consulted for management of agonal breathing further medical management of type B aortic dissection.       PHYSICAL EXAM:    General: thin elderly male, lying in bed, unresponsive to verbal or deep sternal rub  HEENT: NC/AT; PERRL, anicteric sclera; MMM  Neck: supple  Cardiovascular: +S1/S2, RRR, no murmurs   Respiratory: patient with increased WOB- chest retractions, belly breathing, rapid shallow breaths; diffuse rhonchorous breath sounds bilaterally; satting 88-96% on NRB mask  Gastrointestinal: soft, ND, decreased BS   Extremities: WWP; no edema, clubbing or cyanosis. s/p RUE amputation. left radial A line  Vascular: 2+ left radial pulse  Neurological: AAOx0; obtunded and non-responsive; unable to assess neuro exam d/t clinical condition     VITAL SIGNS:  Vital Signs Last 24 Hrs  T(C): 36.7 (16 Aug 2022 17:01), Max: 36.8 (16 Aug 2022 14:24)  T(F): 98.1 (16 Aug 2022 17:01), Max: 98.2 (16 Aug 2022 14:24)  HR: 45 (16 Aug 2022 16:00) (45 - 69)  BP: 107/44 (16 Aug 2022 16:00) (107/44 - 176/81)  BP(mean): 63 (16 Aug 2022 16:00) (63 - 95)  RR: 40 (16 Aug 2022 16:00) (18 - 50)  SpO2: 93% (16 Aug 2022 16:00) (82% - 98%)    Parameters below as of 16 Aug 2022 16:00  Patient On (Oxygen Delivery Method): mask, nonrebreather          MEDICATIONS:  MEDICATIONS  (STANDING):  metoprolol tartrate Injectable 5 milliGRAM(s) IV Push every 8 hours  nitroglycerin    Patch 0.2 mG/Hr(s) 1 patch Transdermal daily  piperacillin/tazobactam IVPB.. 2.25 Gram(s) IV Intermittent every 6 hours  sodium chloride 0.9% lock flush 3 milliLiter(s) IV Push every 8 hours    MEDICATIONS  (PRN):  hydrALAZINE Injectable 10 milliGRAM(s) IV Push every 6 hours PRN SBP greater than 150      ALLERGIES:  Allergies    furosemide (Hives)  Sulfac 10% (Hives)    Intolerances        LABS:                        9.7    13.64 )-----------( 175      ( 16 Aug 2022 03:18 )             28.6     08-16    140  |  109<H>  |  101<H>  ----------------------------<  114<H>  4.8   |  19<L>  |  2.58<H>    Ca    11.6<H>      16 Aug 2022 03:18  Phos  2.9     08-16  Mg     1.8     08-16    TPro  6.1  /  Alb  3.9  /  TBili  1.1  /  DBili  x   /  AST  90<H>  /  ALT  44  /  AlkPhos  114  08-16    PT/INR - ( 16 Aug 2022 03:18 )   PT: 19.4 sec;   INR: 1.62          PTT - ( 16 Aug 2022 03:18 )  PTT:29.1 sec  Urinalysis Basic - ( 15 Aug 2022 22:36 )    Color: Yellow / Appearance: Clear / S.015 / pH: x  Gluc: x / Ketone: NEGATIVE  / Bili: Negative / Urobili: 0.2 E.U./dL   Blood: x / Protein: Trace mg/dL / Nitrite: NEGATIVE   Leuk Esterase: NEGATIVE / RBC: 5-10 /HPF / WBC < 5 /HPF   Sq Epi: x / Non Sq Epi: 0-5 /HPF / Bacteria: Many /HPF      CAPILLARY BLOOD GLUCOSE      POCT Blood Glucose.: 105 mg/dL (15 Aug 2022 21:34)      RADIOLOGY & ADDITIONAL TESTS: Reviewed. CRITICAL CARE CONSULT NOTE     HPI: 77 y/o male poor historian active DNR/DNI , PMH Cardiac Surgery? (sternal wires on CXR), HTN, CKD, severe PVD, femoral artery stent graph, abdominal aorta stent to right common iliac, Right hand amputation. Admitted to [facility] ED from nursing facility c/o abdominal pain. PT according to facility nurse fell two times there but no reported LOC. CTA chest/Abd revealed aortic dissection extending from arch beyond Left subclavian artery down to right common iliac artery and proximal right external iliac. EKG showed NSTEMI and LBBB then 2:1 AV block. Echo showed severe MR, severe PHTN, moderate AI, falsely NL systolic LV function in the face of severe MR. PT transferred to [facility] under Dr. Piedra for evaluation and management. Vascular surgery consulted for type B aortic dissection repair- patient deemed not surgical candidate. Per discussion with patient's HCP (nephew), would like patient's care to prioritize comfort however not withdraw any current medical interventions. MICU team consulted for management of agonal breathing further medical management of type B aortic dissection.       PHYSICAL EXAM:  General: thin elderly male, lying in bed, minimally withdraws to deep sternal rub  HEENT: NC/AT; PERRL, anicteric sclera; MMM  Neck: supple  Cardiovascular: +S1/S2, RRR, no murmurs   Respiratory: patient with increased WOB- chest retractions, belly breathing, rapid shallow breaths; diffuse rhonchorous breath sounds bilaterally; satting 88-96% on NRB mask  Gastrointestinal: soft, ND, decreased BS   Extremities: WWP; no edema, clubbing or cyanosis. s/p RUE amputation. left radial A line  Vascular: 2+ left radial pulse  Neurological: AAOx0; obtunded and non-responsive; unable to assess neuro exam d/t clinical condition     VITAL SIGNS:  Vital Signs Last 24 Hrs  T(C): 36.7 (16 Aug 2022 17:01), Max: 36.8 (16 Aug 2022 14:24)  T(F): 98.1 (16 Aug 2022 17:01), Max: 98.2 (16 Aug 2022 14:24)  HR: 45 (16 Aug 2022 16:00) (45 - 69)  BP: 107/44 (16 Aug 2022 16:00) (107/44 - 176/81)  BP(mean): 63 (16 Aug 2022 16:00) (63 - 95)  RR: 40 (16 Aug 2022 16:00) (18 - 50)  SpO2: 93% (16 Aug 2022 16:00) (82% - 98%)    Parameters below as of 16 Aug 2022 16:00  Patient On (Oxygen Delivery Method): mask, nonrebreather          MEDICATIONS:  MEDICATIONS  (STANDING):  metoprolol tartrate Injectable 5 milliGRAM(s) IV Push every 8 hours  nitroglycerin    Patch 0.2 mG/Hr(s) 1 patch Transdermal daily  piperacillin/tazobactam IVPB.. 2.25 Gram(s) IV Intermittent every 6 hours  sodium chloride 0.9% lock flush 3 milliLiter(s) IV Push every 8 hours    MEDICATIONS  (PRN):  hydrALAZINE Injectable 10 milliGRAM(s) IV Push every 6 hours PRN SBP greater than 150      ALLERGIES:  Allergies    furosemide (Hives)  Sulfac 10% (Hives)    Intolerances        LABS:                        9.7    13.64 )-----------( 175      ( 16 Aug 2022 03:18 )             28.6     08-16    140  |  109<H>  |  101<H>  ----------------------------<  114<H>  4.8   |  19<L>  |  2.58<H>    Ca    11.6<H>      16 Aug 2022 03:18  Phos  2.9     08-16  Mg     1.8     08-16    TPro  6.1  /  Alb  3.9  /  TBili  1.1  /  DBili  x   /  AST  90<H>  /  ALT  44  /  AlkPhos  114  08-16    PT/INR - ( 16 Aug 2022 03:18 )   PT: 19.4 sec;   INR: 1.62          PTT - ( 16 Aug 2022 03:18 )  PTT:29.1 sec  Urinalysis Basic - ( 15 Aug 2022 22:36 )    Color: Yellow / Appearance: Clear / S.015 / pH: x  Gluc: x / Ketone: NEGATIVE  / Bili: Negative / Urobili: 0.2 E.U./dL   Blood: x / Protein: Trace mg/dL / Nitrite: NEGATIVE   Leuk Esterase: NEGATIVE / RBC: 5-10 /HPF / WBC < 5 /HPF   Sq Epi: x / Non Sq Epi: 0-5 /HPF / Bacteria: Many /HPF      CAPILLARY BLOOD GLUCOSE      POCT Blood Glucose.: 105 mg/dL (15 Aug 2022 21:34)      RADIOLOGY & ADDITIONAL TESTS: Reviewed.

## 2022-08-16 NOTE — CONSULT NOTE ADULT - ASSESSMENT
77 y/o male poor historian active DNR/DNI , [facility] Cardiac Surgery? (sternal wires on CXR), HTN, CKD, severe PVD, femoral artery stent graph, abdominal aorta stent to right common iliac, Right hand amputation, initially admitted to [facility] ED from nursing facility c/o abdominal pain, found to have type B aortic dissection extending from arch beyond Left subclavian artery down to right common iliac artery and proximal right external iliac; deemed non-surgical candidate by vascular surgery. ICU consulted for further management of respiratory distress in setting of aortic dissection.     Recommendations:                 Discussed with critical care attending Dr. Reyes; Recommendations not final until signed by attending.  77 y/o male poor historian active DNR/DNI , [facility] Cardiac Surgery? (sternal wires on CXR), HTN, CKD, severe PVD, femoral artery stent graph, abdominal aorta stent to right common iliac, Right hand amputation, initially admitted to [facility] ED from nursing facility c/o abdominal pain, found to have type B aortic dissection extending from arch beyond Left subclavian artery down to right common iliac artery and proximal right external iliac; deemed non-surgical candidate by vascular surgery. ICU consulted for further management of respiratory distress in setting of aortic dissection.     Recommendations:  Per primary team GOC discussion with patient's family, wishes are to pursue comfort measures in favor of minimizing patient's pain and suffering. Additionally, would like to also pursue non-invasive life prolonging interventions (e.g. IV antibiotics, IVF, NIPPV) as long as efforts continue to remain in alignment of prioritizing optimization of patient's comfort level first and foremost.  - will recommend standing IV opioids and PRN for breakthrough agonal breathing, as per primary team  - will recommend IV glycopyrrolate 0.2mg Q4hrs PRN for oral secretions  - recommend palliative care consult to assist healthcare proxy with medical decision-making, provide emotional support and counseling, and further assist in ongoing goals of care discussions.    At this time, patient does not have indication to warrant transfer to medicine ICU in setting of being primarily managed by comfort measures and non-invasive medical management. Discussed with medical intensivist Dr. Glenis Reyes, above recommendations are finalized after attending attestation.

## 2022-08-16 NOTE — DIETITIAN INITIAL EVALUATION ADULT - ADD RECOMMEND
1. Align nutrition w/ GOC at all times; if PO diet is warranted/within GOC recommend Regular Diet 2. Vitamin C, MVI and Zinc to aid in wound healing 3. Pain and bowel regimen per team 4. Will assess/honor preferences as able 5. Obtain subjective information as able 6. Monitor risk for malnutrition

## 2022-08-16 NOTE — DIETITIAN INITIAL EVALUATION ADULT - OTHER INFO
78 y/o male poor historian active DNR/DNI , [facility] Cardiac Surgery? (sternal wires on CXR), HTN, CKD, severe PVD, femoral artery stent graph, abdominal aorta stent to right common iliac, Right hand amputation. Admitted to [facility] ED from nursing facility c/o abdominal pain. PT according to facility nurse fell two times there but no reported LOC. CTA chest/Abd revealed aortic dissection extending from arch beyond Left subclavian artery down to right common iliac artery and proximal right external iliac. EKG showed NSTEMI and LBBB then 2:1 AV block. Echo showed severe MR, severe PHTN, moderate AI, falsely NL systolic LV function in the face of severe MR. PT transferred to [facility] under Dr. Piedra for evaluation and management. Per critical care team, "in light of advanced Directives and no planned intervention - palliative care consulted at CTS request; continue NPO for now".       Attempted to interview pt however, upon multiple attempts pt asleep unable to arouse thus information obtained via EMR at this time. Pt on nonrebreather mask. Last documented bowel movement 8/15. NKFA documented. Unable to obtain diet recall PTA. Dosing weight 143 pounds. No weight history in EMR. No edema documented at this time. Medial sacral ulcer stage 2. Scottie score=10. Labs reviewed 8/16; noted w/ elevated BUN/Cr. Observed pt with severe wasting in temple and clavicle region. Based on ASPEN guidelines, pt does not meet criteria for malnutrition at this time, however, at risk. Pt currently NPO. RD to follow up per protocol. See nutrition recommendations below.

## 2022-08-16 NOTE — DIETITIAN INITIAL EVALUATION ADULT - OTHER CALCULATIONS
Based on Standards of Care pt >% IBW thus ideal body weight used for all calculations. Needs adjusted for advanced age, pressure injuries, risk of malnutrition.

## 2022-08-16 NOTE — DIETITIAN INITIAL EVALUATION ADULT - PERTINENT LABORATORY DATA
08-16    140  |  109<H>  |  101<H>  ----------------------------<  114<H>  4.8   |  19<L>  |  2.58<H>    Ca    11.6<H>      16 Aug 2022 03:18  Phos  2.9     08-16  Mg     1.8     08-16    TPro  6.1  /  Alb  3.9  /  TBili  1.1  /  DBili  x   /  AST  90<H>  /  ALT  44  /  AlkPhos  114  08-16  POCT Blood Glucose.: 105 mg/dL (08-15-22 @ 21:34)  A1C with Estimated Average Glucose Result: 4.5 % (08-15-22 @ 21:38)

## 2022-08-16 NOTE — PROGRESS NOTE ADULT - SUBJECTIVE AND OBJECTIVE BOX
INTERVAL HPI/OVERNIGHT EVENTS:    Type B aortic dissection   Advanced Directives: DNR/DNI    77yo NH resident Male Hx CABG? (sternal wires on CXR), moderate protein-calorie malnutrition, HTN, CKD IIIb, severe PVD/femoral artery stent graph, abdominal aorta stent to right common iliac, Right hand amputation, hearing impaired presenting to [facility] with Abd pain     reports of fall x 2 at nursing facility  - no head trauma or LOC reported    CT Head: no acute intracranial abnormality  Inc Trop - (+)NSTEMI - heparin infusion started - later off ; metoprolol held in light of bradycardia  EKG: LBBB, bradycardia 50  ECHO: severe MR, severe Pulm HTN, moderate AI, falsely NL systolic LV function in the face of severe MR.   Cardene for BP control     CTa C/A/P: aneurysmal dilation of ascending thoracic aorta (max diameter 4.1cm); aortic dissection extending from arch beyond takeoff of Left subclavian artery down to right common iliac artery and proximal right external iliac. patent vascular stent in distal abd aorta extending into Rt common iliac artery ; patent graft b/w bilateral common fem arteries; L common iliac adn left ext iliac artery thrombosed     Vascular surgery consulted - no intervention   Cardiology consultant recommended CTS eval and pt transferred to [facility] under Dr. Piedra for evaluation and management. On 100% NRB on arrival     HCP (Nohemy) made aware of transfer per documentation  - multiple attempts to contact nephew this am - unsuccessful to date [phone] Markel Hendricks    Patient unresponsive at this time   Contacted PCP - Saji Cox (office - [phone]) cell ([phone]) - indicates patient at baseline awake/alert and interactive - up and ambulatory    deemed NOT a surgical candidate per CTS after eval of patient and review of data   cardene off - lopressor standing and hydralazine prn ordered   aspiration precautions     PMHx includes but is not limited to:   PVD (peripheral vascular disease)  HTN (hypertension)  H/O aortic aneurysm  Stage 3 chronic kidney disease  Hearing impaired  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, right  Femoral artery stenosis  Status post cardiac surgery  chronic recurrent fecal impaction per primary     ICU Vital Signs Last 24 Hrs  T(C): 36.6 (16 Aug 2022 08:49), Max: 36.6 (16 Aug 2022 08:49)  T(F): 97.9 (16 Aug 2022 08:49), Max: 97.9 (16 Aug 2022 08:49)  HR: 64 (16 Aug 2022 11:00) (49 - 69) sinus   BP: 162/65 (16 Aug 2022 11:00) (126/54 - 176/81)  BP(mean): 93 (16 Aug 2022 11:00) (78 - 93)  ABP: 126/54 (16 Aug 2022 08:00) (126/54 - 203/68)  ABP(mean): 78 (16 Aug 2022 08:00) (78 - 109)  RR: 44 (16 Aug 2022 11:00) (18 - 44)  SpO2: 91% (16 Aug 2022 11:00) (88% - 98%) 100% NRB     Qtts: none    I&O's Summary    15 Aug 2022 07:01  -  16 Aug 2022 07:00  --------------------------------------------------------  IN: 1207.5 mL / OUT: 832 mL / NET: 375.5 mL    16 Aug 2022 07:01  -  16 Aug 2022 11:23  --------------------------------------------------------  IN: 290 mL / OUT: 60 mL / NET: 230 mL    Physical Exam    Heart  Lungs  Abd  Ext  Chest  Neuro  Skin    LABS:                        9.7    13.64 )-----------( 175      ( 16 Aug 2022 03:18 )             28.6     08-16    140  |  109<H>  |  101<H>  ----------------------------<  114<H>  4.8   |  19<L>  |  2.58<H>    Ca    11.6<H>      16 Aug 2022 03:18  Phos  2.9     08-16  Mg     1.8     08-16    TPro  6.1  /  Alb  3.9  /  TBili  1.1  /  DBili  x   /  AST  90<H>  /  ALT  44  /  AlkPhos  114  08-16    PT/INR - ( 16 Aug 2022 03:18 )   PT: 19.4 sec;   INR: 1.62          PTT - ( 16 Aug 2022 03:18 )  PTT:29.1 sec  Urinalysis Basic - ( 15 Aug 2022 22:36 )    Color: Yellow / Appearance: Clear / S.015 / pH: x  Gluc: x / Ketone: NEGATIVE  / Bili: Negative / Urobili: 0.2 E.U./dL   Blood: x / Protein: Trace mg/dL / Nitrite: NEGATIVE   Leuk Esterase: NEGATIVE / RBC: 5-10 /HPF / WBC < 5 /HPF   Sq Epi: x / Non Sq Epi: 0-5 /HPF / Bacteria: Many /HPF      ABG - ( 16 Aug 2022 03:15 )  pH, Arterial: 7.43  pH, Blood: x     /  pCO2: 26    /  pO2: 70    / HCO3: 17    / Base Excess: -5.4  /  SaO2: 95.1                RADIOLOGY & ADDITIONAL STUDIES:    I have spent/provided stated minutes of critical care time to this patient:  INTERVAL HPI/OVERNIGHT EVENTS:    Type B aortic dissection   Advanced Directives: DNR/DNI    75yo NH resident Male Hx CABG? (sternal wires on CXR), moderate protein-calorie malnutrition, HTN, CKD IIIb, severe PVD/femoral artery stent graph, abdominal aorta stent to right common iliac, Right hand amputation, hearing impaired presenting to [facility] with Abd pain     reports of fall x 2 at nursing facility  - no head trauma or LOC reported    CT Head: no acute intracranial abnormality  Inc Trop - (+)NSTEMI - heparin infusion started - later off ; metoprolol held in light of bradycardia  EKG: LBBB, bradycardia 50  ECHO: severe MR, severe Pulm HTN, moderate AI, falsely NL systolic LV function in the face of severe MR.   Cardene for BP control     CTa C/A/P: aneurysmal dilation of ascending thoracic aorta (max diameter 4.1cm); aortic dissection extending from arch beyond takeoff of Left subclavian artery down to right common iliac artery and proximal right external iliac. patent vascular stent in distal abd aorta extending into Rt common iliac artery ; patent graft b/w bilateral common fem arteries; L common iliac adn left ext iliac artery thrombosed     Vascular surgery consulted - no intervention   Cardiology consultant recommended CTS eval and pt transferred to [facility] under Dr. Piedra for evaluation and management. On 100% NRB on arrival     HCP (Nohemy) made aware of transfer per documentation  - multiple attempts to contact nephew this am - unsuccessful to date [phone] Markel Hendricks    Patient unresponsive at this time   Contacted PCP - Saji Cox (office - [phone]) cell ([phone]) - indicates patient at baseline awake/alert and interactive - up and ambulatory    deemed NOT a surgical candidate per CTS after eval of patient and review of data   cardene off - lopressor standing and hydralazine prn ordered   aspiration precautions     PMHx includes but is not limited to:   PVD (peripheral vascular disease)  HTN (hypertension)  H/O aortic aneurysm  Stage 3 chronic kidney disease  Hearing impaired  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, right  Femoral artery stenosis  Status post cardiac surgery  chronic recurrent fecal impaction per primary     ICU Vital Signs Last 24 Hrs  T(C): 36.6 (16 Aug 2022 08:49), Max: 36.6 (16 Aug 2022 08:49)  T(F): 97.9 (16 Aug 2022 08:49), Max: 97.9 (16 Aug 2022 08:49)  HR: 64 (16 Aug 2022 11:00) (49 - 69) sinus   BP: 162/65 (16 Aug 2022 11:00) (126/54 - 176/81)  BP(mean): 93 (16 Aug 2022 11:00) (78 - 93)  ABP: 126/54 (16 Aug 2022 08:00) (126/54 - 203/68)  ABP(mean): 78 (16 Aug 2022 08:00) (78 - 109)  RR: 44 (16 Aug 2022 11:00) (18 - 44)  SpO2: 91% (16 Aug 2022 11:00) (88% - 98%) 100% NRB     Qtts: none    I&O's Summary    15 Aug 2022 07:01  -  16 Aug 2022 07:00  --------------------------------------------------------  IN: 1207.5 mL / OUT: 832 mL / NET: 375.5 mL    16 Aug 2022 07:01  -  16 Aug 2022 11:23  --------------------------------------------------------  IN: 290 mL / OUT: 60 mL / NET: 230 mL    Physical Exam    Heart - regular (-)rub/gallop  Lungs - scattered rhonchi - no wheeze  Abd - (+)BS soft NTND - no r/r/g appreciated  Ext - warm to touch; no cyanosis/clubbing  Neuro - obtunded - no response to voice/not following commands   Skin - no rash     LABS:                        9.7    13.64 )-----------( 175      ( 16 Aug 2022 03:18 )             28.6     08-16    140  |  109<H>  |  101<H>  ----------------------------<  114<H>  4.8   |  19<L>  |  2.58<H>    Ca    11.6<H>      16 Aug 2022 03:18  Phos  2.9     08-16  Mg     1.8     08-16    TPro  6.1  /  Alb  3.9  /  TBili  1.1  /  DBili  x   /  AST  90<H>  /  ALT  44  /  AlkPhos  114  08-16    PT/INR - ( 16 Aug 2022 03:18 )   PT: 19.4 sec;   INR: 1.62     PTT - ( 16 Aug 2022 03:18 )  PTT:29.1 sec    ABG - ( 16 Aug 2022 03:15 ) 7.43/26/70/95    RADIOLOGY & ADDITIONAL STUDIES: reviewed     Patient transferred for aortic aneurysm and possible intervention - deemed not an operative candidate by both vascular at transferring institution and by CTS at [facility] - unclear etiology to current mental status - not acidotic/normoglycemic/UA negative/CT Head unrevealing    1. CV  hypertensive  elevated trop and LBBB - NSTEMI Dx at transferring institution  prior heparin off in setting of dissection   lopressor IV standing iwth hold parameters (bradycardia) and prn hydralazine - unable to safely take po at this time  nitro paste   LA normal     2. Pulm   rhonchorous   attempted NT suction - nothing - would not tolerate bronch without intubation   DNR/DNI noted  culture and start presumptive Abx - zosyn   aspiration precautions/elevated HOB 45 degrees at all time ; complete NPO     unclear etiology of obtundation at this time - Cx sent and start presumptive Abx     complete NPO for now  d/w primary physician/staff and CTS - multiple attempts to reach nephew to date   SCD and GI prophylaxis    in light of advanced Directives adn no planned intervention - palliative care consulted at CTS request  overall prognosis guarded     I have spent/provided stated minutes of critical care time to this patient: 90

## 2022-08-16 NOTE — PATIENT PROFILE ADULT - FALL HARM RISK - HARM RISK INTERVENTIONS

## 2022-08-17 NOTE — PROVIDER CONTACT NOTE (CRITICAL VALUE NOTIFICATION) - SITUATION
Dr. Whitaker made aware of pt's H/H results and ordered to continue to monitor and no intervention at this time

## 2022-08-17 NOTE — PROVIDER CONTACT NOTE (CHANGE IN STATUS NOTIFICATION) - SITUATION
Patient went asystole with absent heart sounds and respirations. Active DNR order in place. Dr. Whitaker and ASHLIE Olea notified. Pending provider to bedside to assess.

## 2022-08-17 NOTE — PROVIDER CONTACT NOTE (CHANGE IN STATUS NOTIFICATION) - ASSESSMENT
Asystole on monitor, patient without respirations and chest rise, absent pulse, absent heart and lung sounds

## 2022-08-21 LAB
CULTURE RESULTS: SIGNIFICANT CHANGE UP
CULTURE RESULTS: SIGNIFICANT CHANGE UP
SPECIMEN SOURCE: SIGNIFICANT CHANGE UP
SPECIMEN SOURCE: SIGNIFICANT CHANGE UP

## 2022-08-30 DIAGNOSIS — J96.91 RESPIRATORY FAILURE, UNSPECIFIED WITH HYPOXIA: ICD-10-CM

## 2022-08-30 DIAGNOSIS — Z89.111 ACQUIRED ABSENCE OF RIGHT HAND: ICD-10-CM

## 2022-08-30 DIAGNOSIS — Z95.828 PRESENCE OF OTHER VASCULAR IMPLANTS AND GRAFTS: ICD-10-CM

## 2022-08-30 DIAGNOSIS — M47.812 SPONDYLOSIS WITHOUT MYELOPATHY OR RADICULOPATHY, CERVICAL REGION: ICD-10-CM

## 2022-08-30 DIAGNOSIS — J18.9 PNEUMONIA, UNSPECIFIED ORGANISM: ICD-10-CM

## 2022-08-30 DIAGNOSIS — I13.0 HYPERTENSIVE HEART AND CHRONIC KIDNEY DISEASE WITH HEART FAILURE AND STAGE 1 THROUGH STAGE 4 CHRONIC KIDNEY DISEASE, OR UNSPECIFIED CHRONIC KIDNEY DISEASE: ICD-10-CM

## 2022-08-30 DIAGNOSIS — E44.0 MODERATE PROTEIN-CALORIE MALNUTRITION: ICD-10-CM

## 2022-08-30 DIAGNOSIS — E87.2 ACIDOSIS: ICD-10-CM

## 2022-08-30 DIAGNOSIS — Z66 DO NOT RESUSCITATE: ICD-10-CM

## 2022-08-30 DIAGNOSIS — I44.1 ATRIOVENTRICULAR BLOCK, SECOND DEGREE: ICD-10-CM

## 2022-08-30 DIAGNOSIS — F32.A DEPRESSION, UNSPECIFIED: ICD-10-CM

## 2022-08-30 DIAGNOSIS — I27.20 PULMONARY HYPERTENSION, UNSPECIFIED: ICD-10-CM

## 2022-08-30 DIAGNOSIS — M54.50 LOW BACK PAIN, UNSPECIFIED: ICD-10-CM

## 2022-08-30 DIAGNOSIS — Z51.5 ENCOUNTER FOR PALLIATIVE CARE: ICD-10-CM

## 2022-08-30 DIAGNOSIS — I50.33 ACUTE ON CHRONIC DIASTOLIC (CONGESTIVE) HEART FAILURE: ICD-10-CM

## 2022-08-30 DIAGNOSIS — K80.20 CALCULUS OF GALLBLADDER WITHOUT CHOLECYSTITIS WITHOUT OBSTRUCTION: ICD-10-CM

## 2022-08-30 DIAGNOSIS — I74.5 EMBOLISM AND THROMBOSIS OF ILIAC ARTERY: ICD-10-CM

## 2022-08-30 DIAGNOSIS — I21.4 NON-ST ELEVATION (NSTEMI) MYOCARDIAL INFARCTION: ICD-10-CM

## 2022-08-30 DIAGNOSIS — I71.01 DISSECTION OF THORACIC AORTA: ICD-10-CM

## 2022-08-30 DIAGNOSIS — N18.32 CHRONIC KIDNEY DISEASE, STAGE 3B: ICD-10-CM

## 2022-08-30 DIAGNOSIS — I44.2 ATRIOVENTRICULAR BLOCK, COMPLETE: ICD-10-CM

## 2022-08-30 DIAGNOSIS — I08.0 RHEUMATIC DISORDERS OF BOTH MITRAL AND AORTIC VALVES: ICD-10-CM

## 2022-08-30 DIAGNOSIS — I44.7 LEFT BUNDLE-BRANCH BLOCK, UNSPECIFIED: ICD-10-CM

## 2022-08-30 DIAGNOSIS — N28.1 CYST OF KIDNEY, ACQUIRED: ICD-10-CM

## 2022-08-30 DIAGNOSIS — E83.52 HYPERCALCEMIA: ICD-10-CM

## 2022-08-30 DIAGNOSIS — E11.22 TYPE 2 DIABETES MELLITUS WITH DIABETIC CHRONIC KIDNEY DISEASE: ICD-10-CM

## 2022-08-30 DIAGNOSIS — G89.29 OTHER CHRONIC PAIN: ICD-10-CM

## 2022-08-30 DIAGNOSIS — N40.0 BENIGN PROSTATIC HYPERPLASIA WITHOUT LOWER URINARY TRACT SYMPTOMS: ICD-10-CM

## 2022-08-30 DIAGNOSIS — Z91.81 HISTORY OF FALLING: ICD-10-CM

## 2022-08-30 DIAGNOSIS — E11.51 TYPE 2 DIABETES MELLITUS WITH DIABETIC PERIPHERAL ANGIOPATHY WITHOUT GANGRENE: ICD-10-CM

## 2022-08-30 DIAGNOSIS — H91.90 UNSPECIFIED HEARING LOSS, UNSPECIFIED EAR: ICD-10-CM

## 2022-09-15 PROCEDURE — 82330 ASSAY OF CALCIUM: CPT

## 2022-09-15 PROCEDURE — 36415 COLL VENOUS BLD VENIPUNCTURE: CPT

## 2022-09-15 PROCEDURE — 85027 COMPLETE CBC AUTOMATED: CPT

## 2022-09-15 PROCEDURE — 82962 GLUCOSE BLOOD TEST: CPT

## 2022-09-15 PROCEDURE — 80307 DRUG TEST PRSMV CHEM ANLYZR: CPT

## 2022-09-15 PROCEDURE — 86803 HEPATITIS C AB TEST: CPT

## 2022-09-15 PROCEDURE — P9045: CPT

## 2022-09-15 PROCEDURE — 94640 AIRWAY INHALATION TREATMENT: CPT

## 2022-09-15 PROCEDURE — 83735 ASSAY OF MAGNESIUM: CPT

## 2022-09-15 PROCEDURE — 83880 ASSAY OF NATRIURETIC PEPTIDE: CPT

## 2022-09-15 PROCEDURE — 84132 ASSAY OF SERUM POTASSIUM: CPT

## 2022-09-15 PROCEDURE — U0005: CPT

## 2022-09-15 PROCEDURE — 84484 ASSAY OF TROPONIN QUANT: CPT

## 2022-09-15 PROCEDURE — 80061 LIPID PANEL: CPT

## 2022-09-15 PROCEDURE — 85610 PROTHROMBIN TIME: CPT

## 2022-09-15 PROCEDURE — 82803 BLOOD GASES ANY COMBINATION: CPT

## 2022-09-15 PROCEDURE — 84295 ASSAY OF SERUM SODIUM: CPT

## 2022-09-15 PROCEDURE — 84100 ASSAY OF PHOSPHORUS: CPT

## 2022-09-15 PROCEDURE — 86850 RBC ANTIBODY SCREEN: CPT

## 2022-09-15 PROCEDURE — 85730 THROMBOPLASTIN TIME PARTIAL: CPT

## 2022-09-15 PROCEDURE — 86900 BLOOD TYPING SEROLOGIC ABO: CPT

## 2022-09-15 PROCEDURE — 74018 RADEX ABDOMEN 1 VIEW: CPT

## 2022-09-15 PROCEDURE — U0003: CPT

## 2022-09-15 PROCEDURE — 85025 COMPLETE CBC W/AUTO DIFF WBC: CPT

## 2022-09-15 PROCEDURE — 93005 ELECTROCARDIOGRAM TRACING: CPT

## 2022-09-15 PROCEDURE — 83605 ASSAY OF LACTIC ACID: CPT

## 2022-09-15 PROCEDURE — 71045 X-RAY EXAM CHEST 1 VIEW: CPT

## 2022-09-15 PROCEDURE — 81001 URINALYSIS AUTO W/SCOPE: CPT

## 2022-09-15 PROCEDURE — 87040 BLOOD CULTURE FOR BACTERIA: CPT

## 2022-09-15 PROCEDURE — 83036 HEMOGLOBIN GLYCOSYLATED A1C: CPT

## 2022-09-15 PROCEDURE — 80053 COMPREHEN METABOLIC PANEL: CPT

## 2022-09-15 PROCEDURE — 86901 BLOOD TYPING SEROLOGIC RH(D): CPT

## 2022-09-15 PROCEDURE — 84443 ASSAY THYROID STIM HORMONE: CPT
